# Patient Record
Sex: MALE | Race: WHITE | Employment: FULL TIME | URBAN - METROPOLITAN AREA
[De-identification: names, ages, dates, MRNs, and addresses within clinical notes are randomized per-mention and may not be internally consistent; named-entity substitution may affect disease eponyms.]

---

## 2017-12-18 PROBLEM — N40.1 BPH WITH OBSTRUCTION/LOWER URINARY TRACT SYMPTOMS: Status: ACTIVE | Noted: 2017-12-18

## 2017-12-18 PROBLEM — R39.12 WEAK URINARY STREAM: Status: ACTIVE | Noted: 2017-12-18

## 2017-12-18 PROBLEM — N13.8 BPH WITH OBSTRUCTION/LOWER URINARY TRACT SYMPTOMS: Status: ACTIVE | Noted: 2017-12-18
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
## 2020-12-29 ENCOUNTER — HOSPITAL ENCOUNTER (OUTPATIENT)
Dept: PHYSICAL THERAPY | Age: 55
Discharge: HOME | End: 2020-12-29
Payer: COMMERCIAL

## 2020-12-29 PROCEDURE — 97162 PT EVAL MOD COMPLEX 30 MIN: CPT

## 2020-12-29 PROCEDURE — 97110 THERAPEUTIC EXERCISES: CPT

## 2021-01-13 ENCOUNTER — HOSPITAL ENCOUNTER (OUTPATIENT)
Dept: PHYSICAL THERAPY | Age: 56
Discharge: HOME | End: 2021-01-13
Payer: COMMERCIAL

## 2021-01-13 PROCEDURE — 97110 THERAPEUTIC EXERCISES: CPT

## 2021-01-13 PROCEDURE — 97140 MANUAL THERAPY 1/> REGIONS: CPT

## 2021-01-13 NOTE — PROGRESS NOTES
PHYSICAL THERAPY - DAILY TREATMENT NOTE    Patient Name: Tru Florez        Date: 2021  : 1965   yes Patient  Verified  Visit #:   2   of   12  Insurance: Payor: BLUE CROSS / Plan: [facility] / Product Type: PPO /      In time: 4:26 Out time: 5:25   Total Treatment Time: 59     Medicare/[employer] Time Tracking (below)   Total Timed Codes (min):  59 1:1 Treatment Time:  59     TREATMENT AREA = Low back pain [M54.5]    SUBJECTIVE  Pain Level (on 0 to 10 scale):  3-4   10   Medication Changes/New allergies or changes in medical history, any new surgeries or procedures?    no  If yes, update Summary List   Subjective Functional Status/Changes:  []  No changes reported     Patient reports no change in pain level since initial eval.        OBJECTIVE   29 min Therapeutic Exercise:  [x]  See flow sheet   Rationale:      increase ROM and increase strength to improve the patients ability to perform lifting activities     30 min Manual Therapy: MFR/STM to T/S and L/S paraspinals; R TFL, TrP (B) piri; mid T/S PA mobs   Rationale:      decrease pain, increase ROM and increase tissue extensibility to improve patient's ability to perform bending activities. The manual therapy interventions were performed at a separate and distinct time from the therapeutic activities interventions. Billed With/As:   [x] TE   [] TA   [] Neuro   [] Self Care Patient Education: [x] Review HEP    [x] Progressed/Changed HEP based on:   [x] positioning   [x] body mechanics   [] transfers   [] heat/ice application    [] other:      Other Objective/Functional Measures:    1:1 TE = 29'    *Initiated therex per POC (see flowsheet) to improve spinal mobility and glute/core strength for functional ADLs. Req'd cues 100 % of therex for proper form/technique due to 1st F/U appt.    *significant tone along T/S and L/S paraspinals     Post Treatment Pain Level (on 0 to 10) scale:  4  10     ASSESSMENT  Assessment/Changes in Function:     Patient noted no change in pain level following PT session. Encouraged pt to continue to complete HEP in order to maintain mobility. Patient acknowledged understanding. []  See Progress Note/Recertification   Patient will continue to benefit from skilled PT services to modify and progress therapeutic interventions, address functional mobility deficits, address ROM deficits, address strength deficits, analyze and address soft tissue restrictions, analyze and cue movement patterns, analyze and modify body mechanics/ergonomics, assess and modify postural abnormalities and instruct in home and community integration to attain remaining goals. Progress toward goals / Updated goals: · Short Term Goals: To be accomplished in  2  weeks:  1) Establish HEP. Met 12/29/20   2) Patient will report decreased c/o pain to < or = 7/10 at the worst to facilitate improvement in quality of sleep with manageable sx in L/S. Progressing 1/13 indicated by pre vs post tx pain levels  · Long Term Goals: To be accomplished in  5  weeks:  1) Patient to be independent & compliant with a progressive, high level HEP in order to maintain gains made in physical therapy. 2) Patient will report decreased c/o pain to < or = 7/10 at the worst to facilitate improvement in quality of sleep with manageable sx in L/S.    3) Pt will report a GROC score of +2 (a little bit better) indicating improved symptoms and function. 4) Patient to perform >/=65% bridge indicating improved core strength to improve ambulation around the grocery store.         PLAN  [x]  Upgrade activities as tolerated yes Continue plan of care   []  Discharge due to :    []  Other:      Therapist: NOHELIA Obrien    Date: 1/13/2021 Time: 5:48 PM     Future Appointments   Date Time Provider Melo Cardenas   1/13/2021  4:30 PM Antonio Taylor [facility]   1/15/2021  9:30 AM Carissa Roy PTA [id number]   1/18/2021  5:15 PM Yolie Woodward 1/21/2021  4:30 PM Lillian Jacinto PTA [facility]   1/25/2021  6:00 PM Reyna Pike [facility]   1[facility]26/2021  [facility]   1/27/2021  5:45 PM Inder Guzman [facility]

## 2021-01-15 ENCOUNTER — HOSPITAL ENCOUNTER (OUTPATIENT)
Dept: PHYSICAL THERAPY | Age: 56
Discharge: HOME | End: 2021-01-15
Payer: COMMERCIAL

## 2021-01-15 PROCEDURE — 97140 MANUAL THERAPY 1/> REGIONS: CPT

## 2021-01-15 PROCEDURE — 97110 THERAPEUTIC EXERCISES: CPT

## 2021-01-15 NOTE — PROGRESS NOTES
PHYSICAL THERAPY - DAILY TREATMENT NOTE    Patient Name: Judson Newman        Date: 1/15/2021  : 1965   yes Patient  Verified  Visit #:   3   of   12  Insurance: Payor: Sylvie Orourke / [facility] [employer] [facility] / Product Type: PPO /      In time: 930 Out time: 1025   Total Treatment Time: 55     Medicare/[employer] Time Tracking (below)   Total Timed Codes (min):  45 1:1 Treatment Time:  45     TREATMENT AREA =  Low back pain [M54.5]    SUBJECTIVE  Pain Level (on 0 to 10 scale):  5  / 10   Medication Changes/New allergies or changes in medical history, any new surgeries or procedures?    no  If yes, update Summary List   Subjective Functional Status/Changes:  []  No changes reported     I got a little sore after the last visit. OBJECTIVE  Modalities Rationale:     decrease pain and increase tissue extensibility to improve patient's ability to perform pain free ADLs. min [] Estim, type/location:                                      []  att     []  unatt     []  w/US     []  w/ice    []  w/heat    min []  Mechanical Traction: type/lbs                   []  pro   []  sup   []  int   []  cont    []  before manual    []  after manual    min []  Ultrasound, settings/location:      min []  Iontophoresis w/ dexamethasone, location:                                               []  take home patch       []  in clinic   10 min []  Ice     [x]  Heat    location/position: Lumbar in prone. min []  Vasopneumatic Device, press/temp:     min []  Other:    [x] Skin assessment post-treatment (if applicable):    [x]  intact    []  redness- no adverse reaction     []redness  adverse reaction:        30 min Therapeutic Exercise:  [x]  See flow sheet   Rationale:      increase ROM, increase strength, improve coordination and improve balance to improve the patients ability to perform pain free ADLs. 15 min Manual Therapy: STM/DTM (B) lumbar paraspinals.     Rationale:      decrease pain, increase ROM, increase tissue extensibility and decrease trigger points to improve patient's ability to perform pain free ADLs. The manual therapy interventions were performed at a separate and distinct time from the therapeutic activities interventions. Billed With/As:   [x] TE   [] TA   [] Neuro   [] Self Care Patient Education: [x] Review HEP    [] Progressed/Changed HEP based on:   [] positioning   [] body mechanics   [] transfers   [] heat/ice application    [] other:      Other Objective/Functional Measures: Therex per flow sheet. Able to perform bridge with 75% ROM. Post Treatment Pain Level (on 0 to 10) scale:   3  / 10     ASSESSMENT  Assessment/Changes in Function:     Tight (B) lumbar paraspinals. VC's for abdominal draw. []  See Progress Note/Recertification   Patient will continue to benefit from skilled PT services to modify and progress therapeutic interventions, address functional mobility deficits, address ROM deficits, address strength deficits, analyze and address soft tissue restrictions, analyze and cue movement patterns, analyze and modify body mechanics/ergonomics and assess and modify postural abnormalities to attain remaining goals. Progress toward goals / Updated goals:    Good progress toward LTG #4.       PLAN  [x]  Upgrade activities as tolerated yes Continue plan of care   []  Discharge due to :    []  Other:      Therapist: Chestine Spurling, PTA    Date: 1/15/2021 Time: 9:40 AM     Future Appointments   Date Time Provider Melo Cardenas   1/18/2021  5:15 PM [facility]   1/21/2021  4:30 PM Jerone Landau, PTA [id number]   1/25/2021  6:00 PM Felicita [facility]   1/26/2021  4:00 PM Conardo [facility] ROLAND SCHED   1/27/2021  5:45 PM Jo Tatum

## 2021-01-18 ENCOUNTER — HOSPITAL ENCOUNTER (OUTPATIENT)
Dept: PHYSICAL THERAPY | Age: 56
Discharge: HOME | End: 2021-01-18
Payer: COMMERCIAL

## 2021-01-18 PROCEDURE — 97140 MANUAL THERAPY 1/> REGIONS: CPT

## 2021-01-18 PROCEDURE — 97110 THERAPEUTIC EXERCISES: CPT

## 2021-01-21 ENCOUNTER — APPOINTMENT (OUTPATIENT)
Dept: PHYSICAL THERAPY | Age: 56
End: 2021-01-21
Payer: COMMERCIAL

## 2021-01-25 ENCOUNTER — HOSPITAL ENCOUNTER (OUTPATIENT)
Dept: PHYSICAL THERAPY | Age: 56
Discharge: HOME | End: 2021-01-25
Payer: COMMERCIAL

## 2021-01-25 PROCEDURE — 97140 MANUAL THERAPY 1/> REGIONS: CPT

## 2021-01-25 PROCEDURE — 97110 THERAPEUTIC EXERCISES: CPT

## 2021-01-25 NOTE — PROGRESS NOTES
PHYSICAL THERAPY - DAILY TREATMENT NOTE    Patient Name: Tru Florez        Date: 2021  : 1965   yes Patient  Verified  Visit #:   5   of   12  Insurance: Payor: BLUE CROSS / Plan: Kanika Mendoza / Product Type: PPO /      In time: 555 Out time: 455   Total Treatment Time: 50     Medicare/[facility] Time Tracking (below)   Total Timed Codes (min):  50 1:1 Treatment Time:       TREATMENT AREA =  Low back pain [M54.5]    SUBJECTIVE  Pain Level (on 0 to 10 scale):  5  / 10   Medication Changes/New allergies or changes in medical history, any new surgeries or procedures?    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 OBJECTIVE  30 min Therapeutic Exercise:  [x]  See flow sheet   Rationale:      increase ROM, increase strength and improve coordination to improve the patients ability to perform pain free ADLs. 20 Min Manual Therapy: STM (B) lumbar paraspinals. Rationale:      decrease pain, increase ROM, increase tissue extensibility and decrease trigger points to improve patient's ability to perform pain free ADLs. The manual therapy interventions were performed at a separate and distinct time from the therapeutic activities interventions. Billed With/As:   [x] TE   [] TA   [] Neuro   [] Self Care Patient Education: [x] Review HEP    [] Progressed/Changed HEP based on:   [] positioning   [] body mechanics   [] transfers   [] heat/ice application    [] other:      Other Objective/Functional Measures: Therex per flow sheet. Post Treatment Pain Level (on 0 to 10) scale:   5  / 10     ASSESSMENT  Assessment/Changes in Function:     No exacerbation of symptoms with today's session.       []  See Progress Note/Recertification   Patient will continue to benefit from skilled PT services to modify and progress therapeutic interventions, address functional mobility deficits, address ROM deficits, address strength deficits, analyze and address soft tissue restrictions, analyze and cue movement patterns, analyze and modify body mechanics/ergonomics and assess and modify postural abnormalities to attain remaining goals. Progress toward goals / Updated goals:    No change in progress toward LTG's with today's session.       PLAN  [x]  Upgrade activities as tolerated yes Continue plan of care   []  Discharge due to :    []  Other:      Therapist: Dianne Carty PTA    Date: 1/25/2021 Time: 6:50 PM     Future Appointments   Date Time Provider Melo Cardenas   1/26/2021  4:00 PM Ernestina Medina   1/27/2021  5:45 PM Jess Holt

## 2021-01-26 PROBLEM — M54.50 LOW BACK PAIN: Status: ACTIVE | Noted: 2021-01-26

## 2021-01-26 PROBLEM — H90.5 SENSORINEURAL HEARING LOSS: Status: ACTIVE | Noted: 2021-01-26

## 2021-01-26 PROBLEM — B35.1 ONYCHOMYCOSIS DUE TO DERMATOPHYTE: Status: ACTIVE | Noted: 2021-01-26

## 2021-01-26 PROBLEM — R53.83 OTHER MALAISE AND FATIGUE: Status: ACTIVE | Noted: 2021-01-26

## 2021-01-26 PROBLEM — H93.19 TINNITUS: Status: ACTIVE | Noted: 2021-01-26

## 2021-01-26 PROBLEM — R51.9 HEADACHE: Status: ACTIVE | Noted: 2021-01-26

## 2021-01-26 PROBLEM — G47.30 SLEEP APNEA: Status: ACTIVE | Noted: 2021-01-26

## 2021-01-26 PROBLEM — E03.9 HYPOTHYROIDISM: Status: ACTIVE | Noted: 2018-02-08

## 2021-01-26 PROBLEM — F52.8 PSYCHOSEXUAL DYSFUNCTION WITH INHIBITED SEXUAL EXCITEMENT: Status: ACTIVE | Noted: 2021-01-26

## 2021-01-26 PROBLEM — J31.0 CHRONIC RHINITIS: Status: ACTIVE | Noted: 2021-01-26

## 2021-01-26 PROBLEM — R53.81 OTHER MALAISE AND FATIGUE: Status: ACTIVE | Noted: 2021-01-26

## 2021-01-26 PROBLEM — K21.9 GASTROESOPHAGEAL REFLUX DISEASE: Status: ACTIVE | Noted: 2021-01-26

## 2021-01-27 ENCOUNTER — HOSPITAL ENCOUNTER (OUTPATIENT)
Dept: PHYSICAL THERAPY | Age: 56
Discharge: HOME | End: 2021-01-27
Payer: COMMERCIAL

## 2021-01-27 PROCEDURE — 97110 THERAPEUTIC EXERCISES: CPT

## 2021-01-27 PROCEDURE — 97140 MANUAL THERAPY 1/> REGIONS: CPT

## 2021-01-27 NOTE — PROGRESS NOTES
PHYSICAL THERAPY - DAILY TREATMENT NOTE    Patient Name: Manny Suh        Date: 2021  : 1965   yes Patient  Verified  Visit #:      12  Insurance: Payor: Cristal Sessions / [facility] / Product Type: PPO /      In time: 5:53 Out time: 6:22   Total Treatment Time: 29     Medicare/[facility] Time Tracking (below)   Total Timed Codes (min):  29 1:1 Treatment Time: 29     TREATMENT AREA =  Low back pain [M54.5]    SUBJECTIVE  Pain Level (on 0 to 10 scale):  4-5   10   Medication Changes/New allergies or changes in medical history, any new surgeries or procedures?    no  If yes, update Summary List   Subjective Functional Status/Changes:  []  No changes reported     See DC          OBJECTIVE  29 min Therapeutic Exercise:  [x]  See flow sheet   Rationale:      increase ROM, increase strength and improve coordination to improve the patients ability to perform pain free ADLs. Billed With/As:   [x] TE   [] TA   [] Neuro   [] Self Care Patient Education: [x] Review HEP    [] Progressed/Changed HEP based on:   [] positioning   [] body mechanics   [] transfers   [] heat/ice application    [] other:      Other Objective/Functional Measures:    See DC     Post Treatment Pain Level (on 0 to 10) scale:    10     ASSESSMENT  Assessment/Changes in Function:     See DC     []  See Progress Note/Recertification   Patient will continue to benefit from skilled PT services to modify and progress therapeutic interventions, address functional mobility deficits, address ROM deficits, address strength deficits, analyze and address soft tissue restrictions, analyze and cue movement patterns, analyze and modify body mechanics/ergonomics and assess and modify postural abnormalities to attain remaining goals.    Progress toward goals / Updated goals:    See DC     PLAN  [x]  Upgrade activities as tolerated yes Continue plan of care   []  Discharge due to :    []  Other:      Therapist: Ulysses Godoy Date: 1/27/2021 Time: 6:50 PM     Future Appointments   Date Time Provider Melo Cardenas   1/27/2021  5:45 PM Wendy Davison [address] [facility]   3/26/2021  9:00 AM [facility] NURSE [facility] ROLAND CASTILLO   4/9/2021  9:30 AM Kay Woods PA-C [facility]

## 2021-01-27 NOTE — PROGRESS NOTES
[address] PHYSICAL THERAPY  [address] [place] [address] 51, [address],[facility], 70 [facility] - Phone: [phone]  Fax: [phone]  DISCHARGE SUMMARY  Patient Name: Marcy Walter : 1965   Treatment/Medical Diagnosis: Low back pain [M54.5]   Referral Source: Allyson Ko NP     Date of Initial Visit: 2020 Attended Visits: 6 Missed Visits: 0     SUMMARY OF TREATMENT  Patient has attended 6 PT sessions, including an initial evaluation, for LBP. PT has included manual therapy, therapeutic exercises, patient education, body mechanics, posture modification, and home exercise program to improve L/S AROM/flexibility and core/glute stability as well as decrease pain. CURRENT STATUS  The pt has progressed slowly with therapy. Pt reports 65% improvement in L/S sx since initiating PT services. FOTO increased by 6 points indicating improved functional status and quality of life. Patient's self reported Global Rating of Change (GROC) score is (+5) A good deal better. Average LBP 4-5/10 and 65/10 at the worst. Functional impairments: forward bending, sleeping, and lifting. Secondary to limited progression towards LTGs and improvement in L/S sx with participation PT services patient is appropriate for DC to home management of sx at this time. Reccommended follow up with referring MD in order to facilitate in further avenues to address management of L/S pain. Goal/Measure of Progress Goal Met? 1. Patient to be independent & compliant with a progressive, high level HEP in order to maintain gains made in physical therapy. Status at last Eval: Goal Established Current Status: I with HEP yes   2. Patient will report decreased c/o pain to < or = 7/10 at the worst to facilitate improvement in quality of sleep with manageable sx in L/S. Status at last Eval: 10/10 pain at the worst Current Status: 8/10 pain at the worst Progressing   3.   Pt will report a GROC score of +2 (a little bit better) indicating improved symptoms and function. Status at last Eval: Goal Established Current Status: (+5) A good deal better yes   4. Patient to perform >/=65% bridge indicating improved core strength to improve ambulation around the grocery store. Status at last Eval:  Supine bridge <= 25% Current Status: Supine Bridge = 75% yes     RECOMMENDATIONS  Discontinue therapy due to lack of appreciable progress towards goals. If you have any questions/comments please contact us directly at [phone]. Thank you for allowing us to assist in the care of your patient.     Therapist Signature: Edvin Mancini Date: 1/27/2021      Time: 10:14 AM

## 2023-04-25 NOTE — PROGRESS NOTES
Infusion Nursing Note:  Emiliano Aguiar presents today for IVIG.    Patient seen by provider today: No   present during visit today: Not Applicable.    Note: Tylenol and benadryl given for premeds. Declined solucortef.    IVIG initiated at 0.5ml/kg/hr and increased every 15 minutes by 0.5ml/kg/hr to a max dose of 4 ml/kg/hr.    Intravenous Access:  Peripheral IV placed.    Treatment Conditions:  Biological Infusion Checklist:  ~~~ NOTE: If the patient answers yes to any of the questions below, hold the infusion and contact ordering provider or on-call provider.    1. Have you recently had an elevated temperature, fever, chills, productive cough, coughing for 3 weeks or longer or hemoptysis, abnormal vital signs, night sweats,  chest pain or have you noticed a decrease in your appetite, unexplained weight loss or fatigue? No  2. Do you have any open wounds or new incisions? No  3. Do you have any recent or upcoming hospitalizations, surgeries or dental procedures? No  4. Do you currently have or recently have had any signs of illness or infection or are you on any antibiotics? No  5. Have you had any new, sudden or worsening abdominal pain? No  6. Have you or anyone in your household received a live vaccination in the past 4 weeks? Please note:  No live vaccines while on biologic/chemotherapy until 6 months after the last treatment.  Patient can receive the flu vaccine (shot only) and the pneumovax.  It is optimal for the patient to get these vaccines mid cycle, but they can be given at any time as long as it is not on the day of the infusion. No  7. Have you recently been diagnosed with any new nervous system diseases (ie. Multiple sclerosis, Guillain Bahama, seizures, neurological changes) or cancer diagnosis? No  8. Are you on any form of radiation or chemotherapy? No  9. Are you pregnant or breast feeding or do you have plans of pregnancy in the future? No  10. Have you been having any signs of  PHYSICAL THERAPY - DAILY TREATMENT NOTE    Patient Name: Negar Berumen        Date: 2021  : 1965   yes Patient  Verified  Visit #:      of   12  Insurance: Payor: Ritika Found / Plan: [facility] [place] / Product Type: PPO /      In time: 5:14 Out time: 6:04   Total Treatment Time: 42     Medicare/BCBS Time Tracking (below)   Total Timed Codes (min):  32 1:1 Treatment Time: 32     TREATMENT AREA =  Low back pain [M54.5]    SUBJECTIVE  Pain Level (on 0 to 10 scale):  -7  / 10   Medication Changes/New allergies or changes in medical history, any new surgeries or procedures?    no  If yes, update Summary List   Subjective Functional Status/Changes:  []  No changes reported     Reports he's stretching out          OBJECTIVE  Modalities Rationale:     decrease pain and increase tissue extensibility to improve patient's ability to perform pain free ADLs. min [] Estim, type/location:                                      []  att     []  unatt     []  w/US     []  w/ice    []  w/heat    min []  Mechanical Traction: type/lbs                   []  pro   []  sup   []  int   []  cont    []  before manual    []  after manual    min []  Ultrasound, settings/location:      min []  Iontophoresis w/ dexamethasone, location:                                               []  take home patch       []  in clinic   10 min []  Ice     [x]  Heat    location/position: Lumbar in prone. min []  Vasopneumatic Device, press/temp:     min []  Other:    [x] Skin assessment post-treatment (if applicable):    [x]  intact    []  redness- no adverse reaction     []redness  adverse reaction:        23 min Therapeutic Exercise:  [x]  See flow sheet   Rationale:      increase ROM, increase strength, improve coordination and improve balance to improve the patients ability to perform pain free ADLs.       9 min Manual Therapy: STM/DTM (B) lumbar paraspinals, CPA mobes to T4-8   Rationale:      decrease pain, increase ROM, increase tissue extensibility and decrease trigger points to improve patient's ability to perform pain free ADLs. The manual therapy interventions were performed at a separate and distinct time from the therapeutic activities interventions. Billed With/As:   [x] TE   [] TA   [] Neuro   [] Self Care Patient Education: [x] Review HEP    [] Progressed/Changed HEP based on:   [] positioning   [] body mechanics   [] transfers   [] heat/ice application    [] other:      Other Objective/Functional Measures:    Decreased CPA joint mobility and paraspinal mm tone t/o mid T/S  Added standing hip extension with increased challenge - cuing to decrease forward flexion compensatory motion      Post Treatment Pain Level (on 0 to 10) scale:  6 / 10     ASSESSMENT  Assessment/Changes in Function:     Due to increased pain level at onset of PT services made minimal progressions to therex. []  See Progress Note/Recertification   Patient will continue to benefit from skilled PT services to modify and progress therapeutic interventions, address functional mobility deficits, address ROM deficits, address strength deficits, analyze and address soft tissue restrictions, analyze and cue movement patterns, analyze and modify body mechanics/ergonomics and assess and modify postural abnormalities to attain remaining goals. Progress toward goals / Updated goals:    Progressing towards STGs 1-2.   1) Establish HEP. Goal in progress Pt reports complaince with HEP (1/18/21)  2) Patient will report decreased c/o pain to < or = 7/10 at the worst to facilitate improvement in quality of sleep with manageable sx in L/S.  Goal in progress 7-8/10 pain at the worst following working in garage today (1/18/21)     PLAN  [x]  Upgrade activities as tolerated yes Continue plan of care   []  Discharge due to :    []  Other:      Therapist: Frederick Tello    Date: 1/18/2021 Time: 9:40 AM     Future Appointments   Date Time Provider Department worsening depression or suicidal ideations?  ([facility] only) No  11. Have there been any other new onset medical symptoms? No    Post Infusion Assessment:  Patient tolerated infusion without incident.  Blood return noted pre and post infusion.  Site patent and intact, free from redness, edema or discomfort.  No evidence of extravasations.  Access discontinued per protocol.       Discharge Plan:   Discharge instructions reviewed with: Patient.  Patient and/or family verbalized understanding of discharge instructions and all questions answered.  AVS to patient via [employer].  Patient will return 5/16/23 for next appointment.   Patient discharged in stable condition accompanied by: self.  Departure Mode: Ambulatory.      Nick Omalley RN     [place]   1/18/2021  5:15 PM Eleno Bunn [facility]   1/21/2021  4:30 PM Dudley [id number]   1/25/2021  6:00 PM [facility]   1/26/2021  4:00 PM Ronda [facility]   1/27/2021  5:45 PM Gerardo Enamorado